# Patient Record
Sex: FEMALE | Race: WHITE
[De-identification: names, ages, dates, MRNs, and addresses within clinical notes are randomized per-mention and may not be internally consistent; named-entity substitution may affect disease eponyms.]

---

## 2018-12-20 ENCOUNTER — HOSPITAL ENCOUNTER (EMERGENCY)
Dept: HOSPITAL 91 - FTE | Age: 33
Discharge: HOME | End: 2018-12-20
Payer: SELF-PAY

## 2018-12-20 ENCOUNTER — HOSPITAL ENCOUNTER (EMERGENCY)
Dept: HOSPITAL 10 - FTE | Age: 33
Discharge: HOME | End: 2018-12-20
Payer: SELF-PAY

## 2018-12-20 VITALS — SYSTOLIC BLOOD PRESSURE: 162 MMHG | HEART RATE: 101 BPM | DIASTOLIC BLOOD PRESSURE: 81 MMHG | RESPIRATION RATE: 18 BRPM

## 2018-12-20 VITALS
WEIGHT: 293 LBS | BODY MASS INDEX: 44.41 KG/M2 | HEIGHT: 68 IN | HEIGHT: 68 IN | BODY MASS INDEX: 44.41 KG/M2 | WEIGHT: 293 LBS

## 2018-12-20 DIAGNOSIS — H92.01: Primary | ICD-10-CM

## 2018-12-20 PROCEDURE — 99283 EMERGENCY DEPT VISIT LOW MDM: CPT

## 2018-12-20 NOTE — ERD
ER Documentation


Chief Complaint


Chief Complaint





pt is bib self with c/o right ear pain starting saturday





HPI


33-year-old female presenting with right ear pain times 5 days.  She took Motrin


last night but no other medications.  She has had no bleeding or purulence from 


the ear.  Has had a runny nose over the last week which is resolved and a mild 


cough.  Denies any dental pain.  Denies headaches or vomiting.  Denies fever.  


Denies medical problems.  NKDA.  Surgical history denies.  Social history denies





ROS


All systems reviewed and are negative except as per history of present illness.





Medications


Home Meds


Active Scripts


Neomycin/Polymyxin/Hydrocort* (Cortisporin* Otic) 10 Ml Susp, 4 DROP RIGHT EAR 


QID for 7 Days, EA


   Prov:JODY MARTÍNEZ PA-C         12/20/18


Amoxicillin/Potassium Clav (Amox-Clav 875-125 mg Tablet) 875-125 mg Tab, 1 TAB 


PO BID for 7 Days, #14 TAB


   Prov:JODY MARTÍNEZ PA-C         12/20/18





Allergies


Allergies:  


Coded Allergies:  


     No Known Allergy (Unverified , 12/20/18)





PMhx/Soc


Medical and Surgical Hx:  pt denies Medical Hx, pt denies Surgical Hx


Hx Alcohol Use:  No


Hx Substance Use:  No


Hx Tobacco Use:  No


Smoking Status:  Never smoker





FmHx


Family History:  No diabetes, No coronary disease, No other





Physical Exam


Vitals





Vital Signs


  Date      Temp  Pulse  Resp  B/P (MAP)   Pulse Ox  O2          O2 Flow    FiO2


Time                                                 Delivery    Rate


  12/20/18  98.1    101    18      162/81        98


     09:40                          (108)





Physical Exam


GENERAL: The patient is well-appearing, well-nourished, in no acute distress


HEENT: Atraumatic.  Conjunctivae are pink.  Pupils equal, round, and reactive to


light.  There is no scleral icterus.  No mastoid tenderness.  Mild tragal 


tenderness. Cerumen within ear canal


CHEST: Clear to auscultation bilaterally.  There are no rales, wheezes or 


rhonchi.


HEART: Regular rate and rhythm.  No murmurs, clicks, rubs or gallops.  No S3 or 


S4.





Procedures/MDM


MDM: 33-year-old female presenting with ear pain.  Patient does have cerumen and


mild tragal tenderness so I will treat for both otitis media and otitis externa.


 Patient is recommended to use medications for 1 week and then return to the ER 


to have ear lavage performed.  I have low suspicion for mastoiditis.  I have low


suspicion for meningitis.  Patient is discharged stricter precautions and told 


to follow-up with primary care within 1-2 days for close evaluation.  All 


questions answered at discharge





Departure


Diagnosis:  


   Primary Impression:  


   Ear problem


Condition:  Stable


Patient Instructions:  Understanding Middle Ear Infections


Referrals:  


Formerly [facility], [facility]–1983


YOU HAVE RECEIVED A MEDICAL SCREENING EXAM AND THE RESULTS INDICATE THAT YOU DO 


NOT HAVE A CONDITION THAT REQUIRES URGENT TREATMENT IN THE EMERGENCY DEPARTMENT.





FURTHER EVALUATION AND TREATMENT OF YOUR CONDITION CAN WAIT UNTIL YOU ARE SEEN 


IN YOUR DOCTORS OFFICE WITHIN THE NEXT 1-2 DAYS. IT IS YOUR RESPONSIBILITY TO 


MAKE AN APPOINTMENT FOR FOLOW-UP CARE.





IF YOU HAVE A PRIMARY DOCTOR


--you should call your primary doctor and schedule an appointment





IF YOU DO NOT HAVE A PRIMARY DOCTOR YOU CAN CALL OUR PHYSICIAN REFERRAL HOTLINE 


AT


 [phone] 





IF YOU CAN NOT AFFORD TO SEE A PHYSICIAN YOU CAN CHOSE FROM THE FOLLOWING 


[facility] CLINICS





[facility] [phone] [address] [facility] [phone] [address]. [facility] [phone]


[address] [facility] [phone] [address] [facility] [phone]


[address] [facility]. [phone]


[address]





Additional Instructions:  


FOLLOW UP WITH YOUR PRIMARY CARE PHYSICIAN TOMORROW.Return to this facility if 


you are not improving as expected.











JODY MARTÍNEZ PA-C       Dec 20, 2018 10:15